# Patient Record
Sex: FEMALE | Race: WHITE | NOT HISPANIC OR LATINO | URBAN - METROPOLITAN AREA
[De-identification: names, ages, dates, MRNs, and addresses within clinical notes are randomized per-mention and may not be internally consistent; named-entity substitution may affect disease eponyms.]

---

## 2022-07-01 ENCOUNTER — EMERGENCY (EMERGENCY)
Facility: HOSPITAL | Age: 22
LOS: 1 days | Discharge: HOME | End: 2022-07-01
Attending: EMERGENCY MEDICINE | Admitting: EMERGENCY MEDICINE
Payer: COMMERCIAL

## 2022-07-01 VITALS
RESPIRATION RATE: 18 BRPM | SYSTOLIC BLOOD PRESSURE: 127 MMHG | OXYGEN SATURATION: 99 % | HEIGHT: 70 IN | TEMPERATURE: 98 F | DIASTOLIC BLOOD PRESSURE: 80 MMHG | WEIGHT: 145.06 LBS | HEART RATE: 90 BPM

## 2022-07-01 VITALS
TEMPERATURE: 98 F | OXYGEN SATURATION: 99 % | SYSTOLIC BLOOD PRESSURE: 126 MMHG | HEART RATE: 76 BPM | RESPIRATION RATE: 16 BRPM | DIASTOLIC BLOOD PRESSURE: 71 MMHG

## 2022-07-01 DIAGNOSIS — Z20.822 CONTACT WITH AND (SUSPECTED) EXPOSURE TO COVID-19: ICD-10-CM

## 2022-07-01 DIAGNOSIS — R06.02 SHORTNESS OF BREATH: ICD-10-CM

## 2022-07-01 DIAGNOSIS — R00.2 PALPITATIONS: ICD-10-CM

## 2022-07-01 DIAGNOSIS — Z86.16 PERSONAL HISTORY OF COVID-19: ICD-10-CM

## 2022-07-01 DIAGNOSIS — R07.89 OTHER CHEST PAIN: ICD-10-CM

## 2022-07-01 LAB
ALBUMIN SERPL ELPH-MCNC: 4.6 G/DL — SIGNIFICANT CHANGE UP (ref 3.3–5)
ALP SERPL-CCNC: 73 U/L — SIGNIFICANT CHANGE UP (ref 40–120)
ALT FLD-CCNC: 18 U/L — SIGNIFICANT CHANGE UP (ref 10–45)
ANION GAP SERPL CALC-SCNC: 14 MMOL/L — SIGNIFICANT CHANGE UP (ref 5–17)
APTT BLD: 29.7 SEC — SIGNIFICANT CHANGE UP (ref 27.5–35.5)
AST SERPL-CCNC: 17 U/L — SIGNIFICANT CHANGE UP (ref 10–40)
BASOPHILS # BLD AUTO: 0.04 K/UL — SIGNIFICANT CHANGE UP (ref 0–0.2)
BASOPHILS NFR BLD AUTO: 0.5 % — SIGNIFICANT CHANGE UP (ref 0–2)
BILIRUB SERPL-MCNC: 0.7 MG/DL — SIGNIFICANT CHANGE UP (ref 0.2–1.2)
BUN SERPL-MCNC: 9 MG/DL — SIGNIFICANT CHANGE UP (ref 7–23)
CALCIUM SERPL-MCNC: 9.3 MG/DL — SIGNIFICANT CHANGE UP (ref 8.4–10.5)
CHLORIDE SERPL-SCNC: 103 MMOL/L — SIGNIFICANT CHANGE UP (ref 96–108)
CO2 SERPL-SCNC: 21 MMOL/L — LOW (ref 22–31)
CREAT SERPL-MCNC: 0.69 MG/DL — SIGNIFICANT CHANGE UP (ref 0.5–1.3)
D DIMER BLD IA.RAPID-MCNC: <150 NG/ML DDU — SIGNIFICANT CHANGE UP
EGFR: 126 ML/MIN/1.73M2 — SIGNIFICANT CHANGE UP
EOSINOPHIL # BLD AUTO: 0.09 K/UL — SIGNIFICANT CHANGE UP (ref 0–0.5)
EOSINOPHIL NFR BLD AUTO: 1.1 % — SIGNIFICANT CHANGE UP (ref 0–6)
GLUCOSE SERPL-MCNC: 85 MG/DL — SIGNIFICANT CHANGE UP (ref 70–99)
HCT VFR BLD CALC: 38.6 % — SIGNIFICANT CHANGE UP (ref 34.5–45)
HGB BLD-MCNC: 13.7 G/DL — SIGNIFICANT CHANGE UP (ref 11.5–15.5)
IMM GRANULOCYTES NFR BLD AUTO: 0.2 % — SIGNIFICANT CHANGE UP (ref 0–1.5)
INR BLD: 1.13 — SIGNIFICANT CHANGE UP (ref 0.88–1.16)
LYMPHOCYTES # BLD AUTO: 3.52 K/UL — HIGH (ref 1–3.3)
LYMPHOCYTES # BLD AUTO: 41.9 % — SIGNIFICANT CHANGE UP (ref 13–44)
MCHC RBC-ENTMCNC: 30.9 PG — SIGNIFICANT CHANGE UP (ref 27–34)
MCHC RBC-ENTMCNC: 35.5 GM/DL — SIGNIFICANT CHANGE UP (ref 32–36)
MCV RBC AUTO: 87.1 FL — SIGNIFICANT CHANGE UP (ref 80–100)
MONOCYTES # BLD AUTO: 0.55 K/UL — SIGNIFICANT CHANGE UP (ref 0–0.9)
MONOCYTES NFR BLD AUTO: 6.5 % — SIGNIFICANT CHANGE UP (ref 2–14)
NEUTROPHILS # BLD AUTO: 4.19 K/UL — SIGNIFICANT CHANGE UP (ref 1.8–7.4)
NEUTROPHILS NFR BLD AUTO: 49.8 % — SIGNIFICANT CHANGE UP (ref 43–77)
NRBC # BLD: 0 /100 WBCS — SIGNIFICANT CHANGE UP (ref 0–0)
NT-PROBNP SERPL-SCNC: 55 PG/ML — SIGNIFICANT CHANGE UP (ref 0–300)
PLATELET # BLD AUTO: 321 K/UL — SIGNIFICANT CHANGE UP (ref 150–400)
POTASSIUM SERPL-MCNC: 4.2 MMOL/L — SIGNIFICANT CHANGE UP (ref 3.5–5.3)
POTASSIUM SERPL-SCNC: 4.2 MMOL/L — SIGNIFICANT CHANGE UP (ref 3.5–5.3)
PROT SERPL-MCNC: 7.5 G/DL — SIGNIFICANT CHANGE UP (ref 6–8.3)
PROTHROM AB SERPL-ACNC: 13.5 SEC — HIGH (ref 10.5–13.4)
RBC # BLD: 4.43 M/UL — SIGNIFICANT CHANGE UP (ref 3.8–5.2)
RBC # FLD: 12.6 % — SIGNIFICANT CHANGE UP (ref 10.3–14.5)
SARS-COV-2 RNA SPEC QL NAA+PROBE: NEGATIVE — SIGNIFICANT CHANGE UP
SODIUM SERPL-SCNC: 138 MMOL/L — SIGNIFICANT CHANGE UP (ref 135–145)
TROPONIN T SERPL-MCNC: <0.01 NG/ML — SIGNIFICANT CHANGE UP (ref 0–0.01)
TSH SERPL-MCNC: 2.82 UIU/ML — SIGNIFICANT CHANGE UP (ref 0.27–4.2)
WBC # BLD: 8.41 K/UL — SIGNIFICANT CHANGE UP (ref 3.8–10.5)
WBC # FLD AUTO: 8.41 K/UL — SIGNIFICANT CHANGE UP (ref 3.8–10.5)

## 2022-07-01 PROCEDURE — 85730 THROMBOPLASTIN TIME PARTIAL: CPT

## 2022-07-01 PROCEDURE — 84484 ASSAY OF TROPONIN QUANT: CPT

## 2022-07-01 PROCEDURE — 93005 ELECTROCARDIOGRAM TRACING: CPT

## 2022-07-01 PROCEDURE — 80053 COMPREHEN METABOLIC PANEL: CPT

## 2022-07-01 PROCEDURE — 71046 X-RAY EXAM CHEST 2 VIEWS: CPT | Mod: 26

## 2022-07-01 PROCEDURE — 36415 COLL VENOUS BLD VENIPUNCTURE: CPT

## 2022-07-01 PROCEDURE — 85379 FIBRIN DEGRADATION QUANT: CPT

## 2022-07-01 PROCEDURE — 85610 PROTHROMBIN TIME: CPT

## 2022-07-01 PROCEDURE — 84443 ASSAY THYROID STIM HORMONE: CPT

## 2022-07-01 PROCEDURE — 99285 EMERGENCY DEPT VISIT HI MDM: CPT | Mod: 25

## 2022-07-01 PROCEDURE — 85025 COMPLETE CBC W/AUTO DIFF WBC: CPT

## 2022-07-01 PROCEDURE — 87635 SARS-COV-2 COVID-19 AMP PRB: CPT

## 2022-07-01 PROCEDURE — 93010 ELECTROCARDIOGRAM REPORT: CPT | Mod: NC

## 2022-07-01 PROCEDURE — 83880 ASSAY OF NATRIURETIC PEPTIDE: CPT

## 2022-07-01 PROCEDURE — 71046 X-RAY EXAM CHEST 2 VIEWS: CPT

## 2022-07-01 NOTE — ED ADULT TRIAGE NOTE - CHIEF COMPLAINT QUOTE
Pt sent to ED for UC. Pt with ongoing chest pain and palpitations since late May (dx with covid at that time). Pt endorses pain worsening in the last day, worse with inspiration. No SOB. No pmh, sent for r/o PE.

## 2022-07-01 NOTE — ED PROVIDER NOTE - OBJECTIVE STATEMENT
22F presents to ED for right sided chest since having Covid in May. Pt states that she has been having palpitations since having Covid. Today was worse than usual so presents for evaluation. Sometimes is pleuritic. Not as severe as her covid symptoms, no new cough/fever/infectious symptoms. No pedal edema, decreased exertional capacity. pain is central, denies radiation. nothing makes it better or worse. denies FH of PE/DVT.   · Presenting Symptoms: CHEST PAIN, PALPITATIONS, SHORTNESS OF BREATH, mild SOB

## 2022-07-01 NOTE — ED ADULT NURSE NOTE - NSIMPLEMENTINTERV_GEN_ALL_ED
Implemented All Universal Safety Interventions:  [place] to call system. Call bell, personal items and telephone within reach. Instruct patient to call for assistance. Room bathroom lighting operational. Non-slip footwear when patient is off stretcher. Physically safe environment: no spills, clutter or unnecessary equipment. Stretcher in lowest position, wheels locked, appropriate side rails in place.

## 2022-07-01 NOTE — ED PROVIDER NOTE - PATIENT PORTAL LINK FT
You can access the FollowMyHealth Patient Portal offered by [facility] by registering at the following website: [email or web address]. By joining [employer]’s FollowMyHealth portal, you will also be able to view your health information using other applications (apps) compatible with our system.

## 2022-07-01 NOTE — ED PROVIDER NOTE - CLINICAL SUMMARY MEDICAL DECISION MAKING FREE TEXT BOX
full panel labs done to r/o myocarditis, pe, and negative. low suspicion as pt well appearing with normal vitals. likely long covid, to f/u pcp. DC home in NAD with strict return precautions given.

## 2022-07-01 NOTE — ED ADULT NURSE NOTE - OBJECTIVE STATEMENT
22F presents to ED for right sided chest since having Covid in May. Pt states that she has been having palpitations since having Covid. EKG normal in triage.

## 2022-07-01 NOTE — ED ADULT NURSE NOTE - NS ED NOTE ABUSE SUSPICION NEGLECT YN
For information on Fall & Injury Prevention, visit: [email or web address] OR  [email or web address] OR  https://www.cdc.gov/steadi/patient.html No

## 2022-10-13 ENCOUNTER — EMERGENCY (EMERGENCY)
Facility: HOSPITAL | Age: 22
LOS: 1 days | Discharge: HOME | End: 2022-10-13
Admitting: EMERGENCY MEDICINE
Payer: COMMERCIAL

## 2022-10-13 VITALS
HEART RATE: 73 BPM | RESPIRATION RATE: 18 BRPM | WEIGHT: 145.06 LBS | HEIGHT: 70 IN | TEMPERATURE: 98 F | OXYGEN SATURATION: 100 % | SYSTOLIC BLOOD PRESSURE: 96 MMHG | DIASTOLIC BLOOD PRESSURE: 62 MMHG

## 2022-10-13 PROBLEM — Z78.9 OTHER SPECIFIED HEALTH STATUS: Chronic | Status: ACTIVE | Noted: 2022-07-01

## 2022-10-13 PROCEDURE — 99283 EMERGENCY DEPT VISIT LOW MDM: CPT | Mod: 25

## 2022-10-13 PROCEDURE — 99282 EMERGENCY DEPT VISIT SF MDM: CPT

## 2022-10-13 PROCEDURE — 12001 RPR S/N/AX/GEN/TRNK 2.5CM/<: CPT

## 2022-10-13 RX ORDER — LIDOCAINE HCL 20 MG/ML
10 VIAL (ML) INJECTION ONCE
Refills: 0 | Status: COMPLETED | OUTPATIENT
Start: 2022-10-13 | End: 2022-10-13

## 2022-10-13 RX ORDER — BACITRACIN ZINC 500 UNIT/G
1 OINTMENT IN PACKET (EA) TOPICAL ONCE
Refills: 0 | Status: COMPLETED | OUTPATIENT
Start: 2022-10-13 | End: 2022-10-13

## 2022-10-13 RX ADMIN — Medication 10 MILLILITER(S): at 10:57

## 2022-10-13 RX ADMIN — Medication 1 APPLICATION(S): at 11:46

## 2022-10-13 NOTE — ED PROVIDER NOTE - PHYSICAL EXAMINATION
VITAL SIGNS: I have reviewed nursing notes and confirm.  CONSTITUTIONAL: Well-developed; in no acute distress.   SKIN:  warm and dry, no acute rash.   HEAD:  normocephalic, atraumatic.  EYES: PERRL, EOM intact; conjunctiva and sclera clear.  ENT: No nasal discharge; airway clear.   NECK: Supple; non tender.  EXT: Normal ROM. No clubbing, cyanosis or edema. 2+ pulses to b/l ue/le.  L hand: FROM at 2nd digit MCP, PIP and DIP. Distal sensation intact. Cap refill <2 sec. 1cm linear laceration over finger pad of 2nd digit without nail involvement.   NEURO: Alert, oriented, grossly unremarkable  PSYCH: Cooperative, mood and affect appropriate.

## 2022-10-13 NOTE — ED PROVIDER NOTE - CLINICAL SUMMARY MEDICAL DECISION MAKING FREE TEXT BOX
L 2nd digit finger pad laceration approx 1cm, NVI, closed with two sutures w/o complication. Tdap up to date. Counseled on wound care and return precautions. L 2nd digit finger pad laceration approx 1cm, NVI, closed with three sutures w/o complication. Tdap up to date. Counseled on wound care and return precautions.

## 2022-10-13 NOTE — ED ADULT NURSE NOTE - OBJECTIVE STATEMENT
Pt presents with small laceration to pad of L 2nd finger. "I cut my finger with a knife while cooking". Denies fevers, chills.

## 2022-10-13 NOTE — ED ADULT TRIAGE NOTE - CHIEF COMPLAINT QUOTE
Pt co laceration to L 2nd finger, accidentally cut self with knife while cooking. unknown last tetanus vaccination. Sensation intact, dressing applied, bleeding controlled.

## 2022-10-13 NOTE — ED PROVIDER NOTE - OBJECTIVE STATEMENT
23yo RHD female presents with small laceration to the pad of the left 2nd digit sustained with a knife this morning. She denies numbness or weakness. Bleeding controlled prior to arrival. Tdap up to date.

## 2022-10-13 NOTE — ED PROVIDER NOTE - NS ED ROS FT
Constitutional: No fever. No chills.  Eyes: No redness. No discharge. No vision change.   ENT: No sore throat. No ear pain.  Cardiovascular: No chest pain. No leg swelling.  Respiratory: No cough. No shortness of breath.  GI: No abdominal pain. No vomiting. No diarrhea.   MSK: No joint pain. No back pain.   Skin: No rash. No abrasions. +wound.  Neuro: No numbness. No weakness.   Psych: No known mental health issues.

## 2022-10-13 NOTE — ED PROVIDER NOTE - PATIENT PORTAL LINK FT
You can access the FollowMyHealth Patient Portal offered by [facility] by registering at the following website: [email or web address]. By joining [employer]’s FollowMyHealth portal, you will also be able to view your health information using other applications (apps) compatible with our system.

## 2022-10-13 NOTE — ED ADULT TRIAGE NOTE - BP NONINVASIVE DIASTOLIC (MM HG)
Patient would like communication of their results via:        Cell Phone:   Telephone Information:   Mobile [phone]     Okay to leave a message containing results? Yes     62

## 2022-10-13 NOTE — ED PROVIDER NOTE - NSFOLLOWUPINSTRUCTIONS_ED_ALL_ED_FT
Keep bandage in place for 24 hours. You can then remove the bandage and wash wound with warm, soapy water. Apply a thin layer of antibiotic ointment and replace bandage. Repeat this daily until you follow up for suture removal.    Return in 7 days for suture removal.    Return to the Emergency Department sooner if you develop fever>100.4F, redness, swelling, drainage from wound or any other concerns.
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
## 2022-10-16 DIAGNOSIS — S61.211A LACERATION WITHOUT FOREIGN BODY OF LEFT INDEX FINGER WITHOUT DAMAGE TO NAIL, INITIAL ENCOUNTER: ICD-10-CM
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
## 2022-10-23 ENCOUNTER — EMERGENCY (EMERGENCY)
Facility: HOSPITAL | Age: 22
LOS: 1 days | Discharge: HOME | End: 2022-10-23
Attending: EMERGENCY MEDICINE | Admitting: EMERGENCY MEDICINE
Payer: COMMERCIAL

## 2022-10-23 VITALS
SYSTOLIC BLOOD PRESSURE: 113 MMHG | OXYGEN SATURATION: 98 % | DIASTOLIC BLOOD PRESSURE: 72 MMHG | HEART RATE: 69 BPM | TEMPERATURE: 97 F | RESPIRATION RATE: 18 BRPM

## 2022-10-23 VITALS
HEIGHT: 70 IN | HEART RATE: 81 BPM | TEMPERATURE: 98 F | SYSTOLIC BLOOD PRESSURE: 111 MMHG | OXYGEN SATURATION: 98 % | DIASTOLIC BLOOD PRESSURE: 71 MMHG | RESPIRATION RATE: 18 BRPM

## 2022-10-23 PROCEDURE — 99212 OFFICE O/P EST SF 10 MIN: CPT

## 2022-10-23 PROCEDURE — L9995: CPT

## 2022-10-23 NOTE — ED PROVIDER NOTE - PATIENT PORTAL LINK FT
You can access the FollowMyHealth Patient Portal offered by [facility] by registering at the following website: [email or web address]. By joining [employer]’s FollowMyHealth portal, you will also be able to view your health information using other applications (apps) compatible with our system.

## 2022-10-23 NOTE — ED ADULT NURSE NOTE - OBJECTIVE STATEMENT
Patient presented to ED for suture removal of left index finger. States sutures were placed x 9 days ago. AAJAS, NAD.

## 2022-10-23 NOTE — ED PROVIDER NOTE - NSFOLLOWUPINSTRUCTIONS_ED_ALL_ED_FT
Suture Removal, Care After      The following information offers guidance on how to care for yourself after your procedure. Your health care provider may also give you more specific instructions. If you have problems or questions, contact your health care provider.      What can I expect after the procedure?    After your stitches (sutures) are removed, it is common to have:  •Some discomfort and swelling in the area.      •Slight redness in the area.        Follow these instructions at home:    If you have a dressing:     •Wash your hands with soap and water for at least 20 seconds before and after you change your bandage (dressing). If soap and water are not available, use hand .      •Change your dressing as told by your health care provider. If your dressing becomes wet or dirty, or develops a bad smell, change it as soon as possible.      •If your dressing sticks to your skin, pour warm, clean water over it until it loosens and can be removed without pulling apart the wound edges. Pat the area dry with a soft, clean towel. Do not rub the wound because that may cause bleeding.        Wound care   Two stitched wounds. One is normal. The other is red with pus and infected. •Check your wound every day for signs of infection. Check for:  •More redness, swelling, or pain.      •Fluid or blood.      •New warmth, a rash, or hardness at the wound site.      • Pus or a bad smell.        •Wash your hands with soap and water for at least 20 seconds before and after touching your wound. If soap and water are not available, use hand .      •Keep the wound area dry and clean. Clean and pat the wound dry as told by your health care provider.      •Apply cream or ointment only as told by your health care provider.      •If skin glue or adhesive strips were applied after sutures were removed, leave these closures in place. They may need to stay in place for 2 weeks or longer. If adhesive strip edges start to loosen and curl up, you may trim the loose edges. Do not remove adhesive strips completely unless your health care provider tells you to do that.      •Continue to protect the wound from injury.      • Do not pick at your wound. Picking can cause an infection.      Bathing     • Do not take baths, swim, or use a hot tub until your health care provider approves. Ask your health care provider if you may take showers.    •Follow these steps for showering:  •If you have a dressing, remove it before getting into the shower.      •In the shower, allow soapy water to get on the wound. Avoid scrubbing the wound.      •When you get out of the shower, dry the wound by patting it with a clean towel.      •Reapply a dressing over the wound, if needed.        Scar care     When your wound has completely healed, help decrease the size of your scar by:  •Wearing sunscreen over the scar or covering it with clothing when you are outside. New scars get sunburned easily, which can make scarring worse.      •Gently massaging the scarred area. This can decrease scar thickness.      General instructions    •Take over-the-counter and prescription medicines only as told by your health care provider.      •Keep all follow-up visits. This is important.        Contact a health care provider if:    •You have more redness, swelling, or pain around your wound.      •You have fluid or blood coming from your wound.      •You have new warmth, a rash, or hardness at the wound site.      •You have pus or a bad smell coming from your wound.      •Your wound opens up.        Get help right away if:    •You have a fever or chills.      •You have red streaks coming from your wound.        Summary    •After your sutures are removed, it is common to have some discomfort and swelling in the area.      •Wash your hands with soap and water before you change your bandage (dressing).      •Keep the wound area dry and clean. Do not take baths, swim, or use a hot tub until your health care provider approves.      This information is not intended to replace advice given to you by your health care provider. Make sure you discuss any questions you have with your health care provider.      Document Revised: 04/12/2022 Document Reviewed: 04/12/2022    Elsevier Patient Education © 2022 [employer]

## 2022-10-23 NOTE — ED PROVIDER NOTE - OBJECTIVE STATEMENT
22 F w suture removal - wound healing well- cut to finger 9 days ago  no f/c no redness/warmth  full function of finger
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
## 2022-10-25 DIAGNOSIS — S61.211D LACERATION WITHOUT FOREIGN BODY OF LEFT INDEX FINGER WITHOUT DAMAGE TO NAIL, SUBSEQUENT ENCOUNTER: ICD-10-CM
